# Patient Record
Sex: FEMALE | Race: WHITE | Employment: STUDENT | URBAN - METROPOLITAN AREA
[De-identification: names, ages, dates, MRNs, and addresses within clinical notes are randomized per-mention and may not be internally consistent; named-entity substitution may affect disease eponyms.]

---

## 2017-01-23 ENCOUNTER — HOSPITAL ENCOUNTER (OUTPATIENT)
Age: 22
Discharge: HOME | End: 2017-01-23
Attending: EMERGENCY MEDICINE
Payer: COMMERCIAL

## 2017-01-23 ENCOUNTER — APPOINTMENT (OUTPATIENT)
Dept: GENERAL RADIOLOGY | Age: 22
End: 2017-01-23
Attending: EMERGENCY MEDICINE
Payer: COMMERCIAL

## 2017-01-23 VITALS
DIASTOLIC BLOOD PRESSURE: 77 MMHG | RESPIRATION RATE: 17 BRPM | OXYGEN SATURATION: 98 % | WEIGHT: 195 LBS | HEIGHT: 67 IN | BODY MASS INDEX: 30.61 KG/M2 | HEART RATE: 98 BPM | SYSTOLIC BLOOD PRESSURE: 131 MMHG | TEMPERATURE: 98 F

## 2017-01-23 DIAGNOSIS — R00.2 PALPITATIONS: Primary | ICD-10-CM

## 2017-01-23 LAB
B-HCG UR QL: NEGATIVE
S PYO AG THROAT QL: NEGATIVE

## 2017-01-23 PROCEDURE — 99204 OFFICE O/P NEW MOD 45 MIN: CPT

## 2017-01-23 PROCEDURE — 93010 ELECTROCARDIOGRAM REPORT: CPT

## 2017-01-23 PROCEDURE — 93010 ELECTROCARDIOGRAM REPORT: CPT | Performed by: EMERGENCY MEDICINE

## 2017-01-23 PROCEDURE — 87430 STREP A AG IA: CPT

## 2017-01-23 PROCEDURE — 93005 ELECTROCARDIOGRAM TRACING: CPT

## 2017-01-23 PROCEDURE — 99205 OFFICE O/P NEW HI 60 MIN: CPT

## 2017-01-23 PROCEDURE — 81025 URINE PREGNANCY TEST: CPT

## 2017-01-23 PROCEDURE — 71020 XR CHEST PA + LAT CHEST (CPT=71020): CPT

## 2017-01-23 RX ORDER — ALPRAZOLAM 0.25 MG/1
0.25 TABLET ORAL 3 TIMES DAILY PRN
Qty: 15 TABLET | Refills: 0 | Status: SHIPPED | OUTPATIENT
Start: 2017-01-23

## 2017-01-23 NOTE — ED NOTES
Patient presents to  with complaints of anxiety, palpitations, and sore throat for 4 days. Patient has a history of anxiety and states she felt stressed out when the symptoms started 4 days ago but states it has not improved.  She has been tracking her he

## 2017-01-23 NOTE — ED INITIAL ASSESSMENT (HPI)
Patient presents with possibly a panic attack, palpitation x 4 days . History of anxiety. Patient also c/o sore throat.

## 2017-01-23 NOTE — ED PROVIDER NOTES
Marisabel Israel is a 24year old female who presents for evaluation of palpitations  HPI:   Pt complains of palpitations which she has had for the last 4 days in which  she feels that her heart is beating too fast.  Patient recorded her heart rate with a wr defibrillator at a young age         REVIEW OF SYSTEMS:     GENERAL: Denies fever or Chills.     EYES:denies blurred vision or double vision  HEENT: positive recent sore throat   LUNGS: positive cough  CARDIOVASCULAR: Sharp chest pains lasting for seconds a thickening. BONES: No fracture or visible bony lesion. OTHER: Negative.            Dictated by (CST): Kriss Aguirre MD on 1/23/2017 at 15:44         Approved by (CST): Kriss Aguirre MD on 1/23/2017 at 15:44              =====    CONCLUSION: Nor

## 2017-01-23 NOTE — ED NOTES
Patient reports great improvement. Vital signed stable. Patient states she feels very comfortable going home, and understands to go to the ER if symptoms return or become worse. Patient stable at time of discharge.

## (undated) NOTE — LETTER
[facility] [place] [address] [facility] [address]  Dept: [phone]  Dept Fax: [phone]  Loc: [phone]      January 23, 2017    Patient: Hansel Norton   Date of Visit: 1/23/2017       To Whom It May Concern:    Cem Ramirez

## (undated) NOTE — LETTER
[facility] [place] [address] [facility] [address]  Dept: [phone]  Dept Fax: [phone]  Loc: [phone]      January 23, 2017    Patient: Stanislaw Hughes   Date of Visit: 1/23/2017       To Whom It May Concern:    Charley Garcia

## (undated) NOTE — ED AVS SNAPSHOT
[facility] in [facility] [place] [address] [address]    Phone:  [phone]    Fax:  [phone]           Wale Ralph   MRN: [id number]    Department:  [facility] in [facility]   Date of Visit:  1/ follow-up care and referrals. It is our goal to assure that you are completely satisfied with every aspect of your visit today.   In an effort to constantly improve our service to you, we would appreciate any positive or negative feedback related to the [employer] account. You may have had testing done that requires us to contact you. Please make sure we have your correct phone number on file.      OUR CURRENT HOURS OF OPERATION:  MONDAY THROUGH FRIDAY 8AM - 8PM  WEEKENDS AND HOLIDAYS 8AM - 6PM    I certifi visit,  view other health information, and more. To sign up or find more information, go to [email or web address] and click on the Sign Up Now link in the [employer] box.      Enter your [employer] Activation Code exactly as it appears below along with yo